# Patient Record
Sex: MALE | Race: WHITE
[De-identification: names, ages, dates, MRNs, and addresses within clinical notes are randomized per-mention and may not be internally consistent; named-entity substitution may affect disease eponyms.]

---

## 2021-08-23 ENCOUNTER — HOSPITAL ENCOUNTER (EMERGENCY)
Dept: HOSPITAL 8 - ED | Age: 21
Discharge: HOME | End: 2021-08-23
Payer: COMMERCIAL

## 2021-08-23 VITALS — DIASTOLIC BLOOD PRESSURE: 74 MMHG | SYSTOLIC BLOOD PRESSURE: 124 MMHG

## 2021-08-23 VITALS — HEIGHT: 74 IN | BODY MASS INDEX: 18.62 KG/M2 | WEIGHT: 145.06 LBS

## 2021-08-23 DIAGNOSIS — U07.1: Primary | ICD-10-CM

## 2021-08-23 DIAGNOSIS — B97.89: ICD-10-CM

## 2021-08-23 DIAGNOSIS — J02.8: ICD-10-CM

## 2021-08-23 PROCEDURE — 99283 EMERGENCY DEPT VISIT LOW MDM: CPT

## 2022-01-19 PROBLEM — S83.512A NEW ACL TEAR, LEFT, INITIAL ENCOUNTER: Status: ACTIVE | Noted: 2022-01-19

## 2022-10-14 ENCOUNTER — OFFICE VISIT (OUTPATIENT)
Dept: URGENT CARE | Facility: CLINIC | Age: 22
End: 2022-10-14
Payer: COMMERCIAL

## 2022-10-14 VITALS
SYSTOLIC BLOOD PRESSURE: 108 MMHG | BODY MASS INDEX: 19.28 KG/M2 | WEIGHT: 150.2 LBS | HEART RATE: 74 BPM | RESPIRATION RATE: 14 BRPM | DIASTOLIC BLOOD PRESSURE: 66 MMHG | OXYGEN SATURATION: 99 % | TEMPERATURE: 98.9 F | HEIGHT: 74 IN

## 2022-10-14 DIAGNOSIS — J02.9 SORE THROAT: ICD-10-CM

## 2022-10-14 DIAGNOSIS — U07.1 COVID-19 VIRUS INFECTION: ICD-10-CM

## 2022-10-14 DIAGNOSIS — R52 GENERALIZED BODY ACHES: ICD-10-CM

## 2022-10-14 LAB
EXTERNAL QUALITY CONTROL: NORMAL
INT CON NEG: NORMAL
INT CON POS: NORMAL
SARS-COV+SARS-COV-2 AG RESP QL IA.RAPID: NEGATIVE

## 2022-10-14 PROCEDURE — 99203 OFFICE O/P NEW LOW 30 MIN: CPT | Mod: CS | Performed by: NURSE PRACTITIONER

## 2022-10-14 PROCEDURE — 87426 SARSCOV CORONAVIRUS AG IA: CPT | Performed by: NURSE PRACTITIONER

## 2022-10-14 ASSESSMENT — ENCOUNTER SYMPTOMS
HEADACHES: 1
SORE THROAT: 1
FEVER: 1

## 2022-10-14 NOTE — LETTER
October 14, 2022    To Whom It May Concern:         This is confirmation that Grant Pal Hagan Walsh attended his scheduled appointment with SERINA Yanez on 10/14/22.    Please excuse him from work 10/13/22-10/14/22.    Sincerely,      NINA Yanez.  [phone]

## 2022-10-14 NOTE — PROGRESS NOTES
"Subjective     Grant Pal Walsh is a 21 y.o. male who presents with Sore Throat (X1days, COVID+,  Sore throat, headache, fever, achey)            Pharyngitis   This is a new problem. Episode onset: pt reports new onset of fever, HA and ST that started 2 days ago. Home covid test was positive. he needs a work note. he is interested in antiviral therapy. Associated symptoms include headaches. He has tried nothing for the symptoms.     Review of Systems   Constitutional:  Positive for fever.   HENT:  Positive for sore throat.    Neurological:  Positive for headaches.   All other systems reviewed and are negative.       History reviewed. No pertinent past medical history. History reviewed. No pertinent surgical history.   Social History     Socioeconomic History    Marital status: Single     Spouse name: Not on file    Number of children: Not on file    Years of education: Not on file    Highest education level: Not on file   Occupational History    Not on file   Tobacco Use    Smoking status: Never    Smokeless tobacco: Never   Vaping Use    Vaping Use: Never used   Substance and Sexual Activity    Alcohol use: Not on file    Drug use: Not on file    Sexual activity: Not on file   Other Topics Concern    Not on file   Social History Narrative    Not on file     Social Determinants of Health     Financial Resource Strain: Not on file   Food Insecurity: Not on file   Transportation Needs: Not on file   Physical Activity: Not on file   Stress: Not on file   Social Connections: Not on file   Intimate Partner Violence: Not on file   Housing Stability: Not on file         Objective     /66 (BP Location: Left arm, Patient Position: Sitting, BP Cuff Size: Adult)   Pulse 74   Temp 37.2 °C (98.9 °F) (Temporal)   Resp 14   Ht 1.88 m (6' 2\")   Wt 68.1 kg (150 lb 3.2 oz)   SpO2 99%   BMI 19.28 kg/m²      Physical Exam  Vitals and nursing note reviewed.   Constitutional:       Appearance: Normal appearance. "   HENT:      Head: Normocephalic and atraumatic.      Nose: Nose normal.      Mouth/Throat:      Mouth: Mucous membranes are moist.      Pharynx: Oropharynx is clear.   Eyes:      Extraocular Movements: Extraocular movements intact.      Pupils: Pupils are equal, round, and reactive to light.   Cardiovascular:      Rate and Rhythm: Normal rate and regular rhythm.   Pulmonary:      Effort: Pulmonary effort is normal.      Breath sounds: Normal breath sounds.   Musculoskeletal:         General: Normal range of motion.      Cervical back: Normal range of motion and neck supple.   Skin:     General: Skin is warm and dry.      Capillary Refill: Capillary refill takes less than 2 seconds.   Neurological:      General: No focal deficit present.      Mental Status: He is alert and oriented to person, place, and time. Mental status is at baseline.   Psychiatric:         Mood and Affect: Mood normal.         Thought Content: Thought content normal.         Judgment: Judgment normal.                           Assessment & Plan        1. Sore throat  - POCT SARS-COV Antigen MILDRED (Symptomatic only)    2. Generalized body aches  - POCT SARS-COV Antigen MILDRED (Symptomatic only)    3. COVID-19 virus infection  - Nirmatrelvir&Ritonavir 300/100 20 x 150 MG & 10 x 100MG Tablet Therapy Pack; Take 300 mg nirmatrelvir (two 150 mg tablets) with 100 mg ritonavir (one 100 mg tablet) by mouth, with all three tablets taken together twice daily for 5 days.  Dispense: 30 Each; Refill: 0         Take full course of paxlovid  Encouraged rest and fluids  Patient's vital signs are reassuring and they appear hemodynamically stable and do not require higher level care at this time  I discussed self isolation and provided printed instructions (if applicable)  I discussed ER precautions and provided printed instructions (if applicable)  If requested, I provided the patient with a work note to provide to their employer or school regarding returning to work  and discontinuation of self isolation.  All questions were answered and patient demonstrated verbal understanding of above.  I followed all reasonable PPE precautions during this encounter including but not limited to use of an N95 mask, gloves, and gown if indicated.    Work note provided  Supportive care, differential diagnoses, and indications for immediate follow-up discussed with patient.    Pathogenesis of diagnosis discussed including typical length and natural progression.    Instructed to return to  or nearest emergency department if symptoms fail to improve, for any change in condition, further concerns, or new concerning symptoms.  Patient states understanding of the plan of care and discharge instructions.

## 2023-05-18 ENCOUNTER — OFFICE VISIT (OUTPATIENT)
Dept: URGENT CARE | Facility: CLINIC | Age: 23
End: 2023-05-18

## 2023-05-18 DIAGNOSIS — Z02.1 PRE-EMPLOYMENT DRUG SCREENING: ICD-10-CM

## 2023-05-18 LAB
AMP AMPHETAMINE: NORMAL
COC COCAINE: NORMAL
INT CON NEG: NORMAL
INT CON POS: NORMAL
MET METHAMPHETAMINES: NORMAL
OPI OPIATES: NORMAL
PCP PHENCYCLIDINE: NORMAL
POC DRUG COMMENT 753798-OCCUPATIONAL HEALTH: NEGATIVE
THC: NORMAL

## 2023-05-19 NOTE — PROGRESS NOTES
Subjective     Grant Pal Walsh is a 22 y.o. male who presents with Drug / Alcohol Assessment (Pre employment drug screen )            Drug / Alcohol Assessment        ROS           Objective     There were no vitals taken for this visit.     Physical Exam                        Assessment & Plan        1. Pre-employment drug screening  ***  - POCT 6 Panel Urine Drug Screen

## 2024-09-10 ENCOUNTER — OFFICE VISIT (OUTPATIENT)
Dept: URGENT CARE | Facility: CLINIC | Age: 24
End: 2024-09-10
Payer: COMMERCIAL

## 2024-09-10 VITALS
OXYGEN SATURATION: 98 % | HEIGHT: 74 IN | SYSTOLIC BLOOD PRESSURE: 114 MMHG | DIASTOLIC BLOOD PRESSURE: 72 MMHG | RESPIRATION RATE: 14 BRPM | TEMPERATURE: 98.3 F | BODY MASS INDEX: 19.38 KG/M2 | WEIGHT: 151 LBS | HEART RATE: 81 BPM

## 2024-09-10 DIAGNOSIS — J04.0 LARYNGITIS: ICD-10-CM

## 2024-09-10 DIAGNOSIS — R21 RASH AND NONSPECIFIC SKIN ERUPTION: ICD-10-CM

## 2024-09-10 DIAGNOSIS — J06.9 VIRAL URI WITH COUGH: ICD-10-CM

## 2024-09-10 DIAGNOSIS — J02.9 PHARYNGITIS, UNSPECIFIED ETIOLOGY: ICD-10-CM

## 2024-09-10 LAB — S PYO DNA SPEC NAA+PROBE: NOT DETECTED

## 2024-09-10 PROCEDURE — 99213 OFFICE O/P EST LOW 20 MIN: CPT | Performed by: PHYSICIAN ASSISTANT

## 2024-09-10 PROCEDURE — 3074F SYST BP LT 130 MM HG: CPT | Performed by: PHYSICIAN ASSISTANT

## 2024-09-10 PROCEDURE — 3078F DIAST BP <80 MM HG: CPT | Performed by: PHYSICIAN ASSISTANT

## 2024-09-10 PROCEDURE — 87651 STREP A DNA AMP PROBE: CPT | Performed by: PHYSICIAN ASSISTANT

## 2024-09-10 RX ORDER — DEXTROMETHORPHAN HYDROBROMIDE AND PROMETHAZINE HYDROCHLORIDE 15; 6.25 MG/5ML; MG/5ML
5 SYRUP ORAL EVERY 4 HOURS PRN
Qty: 120 ML | Refills: 0 | Status: SHIPPED | OUTPATIENT
Start: 2024-09-10

## 2024-09-10 RX ORDER — BENZONATATE 200 MG/1
200 CAPSULE ORAL 3 TIMES DAILY PRN
Qty: 60 CAPSULE | Refills: 0 | Status: SHIPPED | OUTPATIENT
Start: 2024-09-10

## 2024-09-10 RX ORDER — DEXAMETHASONE SODIUM PHOSPHATE 10 MG/ML
10 INJECTION INTRAMUSCULAR; INTRAVENOUS ONCE
Status: COMPLETED | OUTPATIENT
Start: 2024-09-10 | End: 2024-09-10

## 2024-09-10 RX ADMIN — DEXAMETHASONE SODIUM PHOSPHATE 10 MG: 10 INJECTION INTRAMUSCULAR; INTRAVENOUS at 11:04

## 2024-09-10 ASSESSMENT — ENCOUNTER SYMPTOMS
VOMITING: 0
COUGH: 1
FEVER: 0
WHEEZING: 0
SHORTNESS OF BREATH: 0
MYALGIAS: 1
CHILLS: 0
SPUTUM PRODUCTION: 0
NAUSEA: 0
SORE THROAT: 1

## 2024-09-10 NOTE — LETTER
[facility]  [address] [id number]     September 10, 2024    Patient: Grant Walsh   YOB: 2000   Date of Visit: 9/10/2024       To Whom It May Concern:    Grant Walsh was seen and treated in our department on 9/10/2024.  He should be excused from missed work for today and tomorrow.    Sincerely,     Jason Doan P.A.-C.

## 2024-09-10 NOTE — PROGRESS NOTES
"Subjective:   Grant Walsh  is a 23 y.o. male who presents for Pharyngitis (X 5 days/ Fever/ body aches/ headache/ loss of voice/ pt requesting advise on flaky skin on both hands )      Pharyngitis   This is a new problem. The current episode started in the past 7 days. Associated symptoms include congestion, coughing and ear pain. Pertinent negatives include no shortness of breath or vomiting.   Patient presents urgent care describing last 5 days of symptoms of upper respiratory infection.  He describes fever and chills over the weekend with resolution over the last 24 to 48 hours.  Patient notes sore throat cough and loss of voice.  Complains of intermittent ear discomfort.  Patient notes cough to be worse than sore throat currently.  He denies nausea or vomiting.  Denies diarrhea.  Patient denies asthma.  Notes history of pneumonia over 4 years ago.  Patient has had COVID in the past.  Patient reports significant other with similar symptoms prior to his onset.  He states she was \"tested at [facility] and came back negative for everything\".  Patient's tried OTC pain reliever/antipyretics for treatment thus far.    Additionally patient notes of last few weeks of dry flaky skin to bilateral hands.  Patient describes minimal itching and slight peeling to the skin on palms of bilateral hands.  Denies specific ill exposures.  Has tried no treatments.    Review of Systems   Constitutional:  Negative for chills and fever (resolved).   HENT:  Positive for congestion, ear pain and sore throat.    Respiratory:  Positive for cough. Negative for sputum production, shortness of breath and wheezing.    Gastrointestinal:  Negative for nausea and vomiting.   Musculoskeletal:  Positive for myalgias.   Skin:  Positive for rash. Negative for itching.       No Known Allergies     Objective:   /72   Pulse 81   Temp 36.8 °C (98.3 °F)   Resp 14   Ht 1.88 m (6' 2\")   Wt 68.5 kg (151 lb)   SpO2 98%   BMI 19.39 " kg/m²     Physical Exam  Vitals and nursing note reviewed.   Constitutional:       General: He is not in acute distress.     Appearance: Normal appearance. He is well-developed. He is not diaphoretic.   HENT:      Head: Normocephalic and atraumatic.      Right Ear: Tympanic membrane, ear canal and external ear normal.      Left Ear: Tympanic membrane, ear canal and external ear normal.      Nose: Nose normal.      Mouth/Throat:      Mouth: Mucous membranes are moist.      Pharynx: Uvula midline. Posterior oropharyngeal erythema (Cobblestoning with deeper erythema) present. No oropharyngeal exudate.      Tonsils: No tonsillar abscesses.   Eyes:      General: No scleral icterus.        Right eye: No discharge.         Left eye: No discharge.      Conjunctiva/sclera: Conjunctivae normal.   Pulmonary:      Effort: Pulmonary effort is normal. No respiratory distress.      Breath sounds: Normal breath sounds. No stridor. No decreased breath sounds, wheezing, rhonchi or rales.   Musculoskeletal:         General: Normal range of motion.      Cervical back: Neck supple.   Skin:     General: Skin is warm and dry.      Coloration: Skin is not pale.      Comments: Minimal dry peeling skin without rash or scaling seen to bilateral hands   Neurological:      Mental Status: He is alert and oriented to person, place, and time.      Coordination: Coordination normal.       POCT STREP - NEGATIVE  Decadron 10 mg oral-tolerates well    Assessment/Plan:   1. Laryngitis    2. Pharyngitis, unspecified etiology  - POCT CEPHEID GROUP A STREP - PCR  - dexamethasone (Decadron) injection (check route below) 10 mg    3. Viral URI with cough    4. Rash and nonspecific skin eruption  Supportive care is reviewed with patient/caregiver - recommend to push PO fluids and electrolytes, suspect self-limiting viral upper respiratory infection with postnasal drainage contributing to sore throat and laryngitis.  Negative strep in clinic.  Patient sent  with work excuse note and cough suppressant.    Recommend hypoallergenic emollients with consistency and some hydrocortisone for hands.  Nonspecific appearance.  No rash present.    I have worn an N95 mask, gloves and eye protection for the entire encounter with this patient.     Differential diagnosis, natural history, supportive care, and indications for immediate follow-up discussed.